# Patient Record
Sex: MALE | Race: AMERICAN INDIAN OR ALASKA NATIVE
[De-identification: names, ages, dates, MRNs, and addresses within clinical notes are randomized per-mention and may not be internally consistent; named-entity substitution may affect disease eponyms.]

---

## 2022-06-18 ENCOUNTER — HOSPITAL ENCOUNTER (EMERGENCY)
Dept: HOSPITAL 5 - ED | Age: 72
LOS: 1 days | Discharge: HOME | End: 2022-06-19
Payer: MEDICARE
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
## 2022-06-18 DIAGNOSIS — F17.290: ICD-10-CM

## 2022-06-18 DIAGNOSIS — I10: ICD-10-CM
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
## 2022-06-18 DIAGNOSIS — S01.112A: Primary | ICD-10-CM

## 2022-06-18 DIAGNOSIS — Z98.890: ICD-10-CM
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
## 2022-06-18 DIAGNOSIS — Z86.73: ICD-10-CM

## 2022-06-18 PROCEDURE — 99283 EMERGENCY DEPT VISIT LOW MDM: CPT

## 2022-06-18 NOTE — EMERGENCY DEPARTMENT REPORT
ED Head Trauma HPI





- General


Chief complaint: Head Injury


Stated complaint: LACERATION LEFT EYEBROW


Time Seen by Provider: 06/18/22 23:24


Source: patient, EMS


Mode of arrival: Stretcher


Limitations: No Limitations





- History of Present Illness


Initial comments: 





71 yo M who present with a fall that occurred about 5 hours ago. Pt denies any 

alcohol drinking today. Bleeding is minimal with direct pressure. He says he 

only trip and fell. Pt however asked me for pain medication. No other modifying 

or associated factors reported. 





- Related Data


                                  Previous Rx's











 Medication  Instructions  Recorded  Last Taken  Type


 


Gabapentin 300 mg PO BID #14 cap 03/07/21 Unknown Rx


 


HYDROcodone/APAP 5-325 [[place] 1 each PO Q6HR PRN #15 tablet 11/17/21 Unknown Rx





5/325]    


 


Ibuprofen [Motrin 400 MG tab] 400 mg PO Q8H PRN #20 tablet 11/17/21 Unknown Rx


 


HYDROcodone/APAP 5-325 [[place] 1 - 2 each PO Q6HR PRN #14 tablet 11/22/21 Unknown

 Rx





5/325]    


 


Spirometers and Accessories 1 each MC TID #1 each 11/22/21 Unknown Rx





[Mistassist]    


 


Naproxen [Naprosyn] 500 mg PO Q12H PRN #20 05/19/22 Unknown Rx


 


traMADoL [Ultram] 50 mg PO Q6HR PRN #20 tablet 05/19/22 Unknown Rx











Allergies/Adverse reactions: 


                                    Allergies











Allergy/AdvReac Type Severity Reaction Status Date / Time


 


No Known Allergies Allergy   Verified 05/19/22 02:04














ED Review of Systems


ROS: 


Stated complaint: LACERATION LEFT EYEBROW


Other details as noted in HPI





Comment: All other systems reviewed and negative


Eyes: other (left upper orbital laceration )





ED Past Medical Hx





- Past Medical History


Previous Medical History?: Yes


Hx Hypertension: Yes


Hx CVA: Yes (Left-sided weakness)


Additional medical history: stroke





- Surgical History


Past Surgical History?: Yes


Additional Surgical History: Neck surgery





- Social History


Smoking Status: Current Every Day Smoker


Substance Use Type: None





- Medications


Home Medications: 


                                Home Medications











 Medication  Instructions  Recorded  Confirmed  Last Taken  Type


 


Gabapentin 300 mg PO BID #14 cap 03/07/21  Unknown Rx


 


HYDROcodone/APAP 5-325 [[place] 1 each PO Q6HR PRN #15 tablet 11/17/21  Unknown Rx





5/325]     


 


Ibuprofen [Motrin 400 MG tab] 400 mg PO Q8H PRN #20 tablet 11/17/21  Unknown Rx


 


HYDROcodone/APAP 5-325 [[place] 1 - 2 each PO Q6HR PRN #14 tablet 11/22/21  Unkno

wn Rx





5/325]     


 


Spirometers and Accessories 1 each MC TID #1 each 11/22/21  Unknown Rx





[Mistassist]     


 


Naproxen [Naprosyn] 500 mg PO Q12H PRN #20 05/19/22  Unknown Rx


 


traMADoL [Ultram] 50 mg PO Q6HR PRN #20 tablet 05/19/22  Unknown Rx














ED Physical Exam





- General


Limitations: No Limitations


General appearance: alert, in no apparent distress





- Head


Head exam: Present: other (left upper orbital laceration 4 cm linear and 2.5 cm 

linear)





- Eye


Eye exam: Present: normal appearance


Pupils: Present: normal accommodation





- ENT


ENT exam: Present: normal exam, normal orophraynx, mucous membranes dry





- Neck


Neck exam: Present: normal inspection, full ROM.  Absent: tenderness





- Respiratory


Respiratory exam: Present: normal lung sounds bilaterally.  Absent: respiratory 

distress, accessory muscle use





- Cardiovascular


Cardiovascular Exam: Present: regular rate, normal rhythm, normal heart sounds





- GI/Abdominal


GI/Abdominal exam: Present: soft, normal bowel sounds.  Absent: distended, 

tenderness





- Extremities Exam


Extremities exam: Present: normal inspection, normal capillary refill.  Absent: 

tenderness





- Back Exam


Back exam: Absent: tenderness





- Neurological Exam


Neurological exam: Present: alert, oriented X3





- Psychiatric


Psychiatric exam: Present: normal affect, normal mood





- Skin


Skin exam: Present: warm, other (laceration to the left upper orbital )





ED Course





                                   Vital Signs











  06/18/22 06/18/22 06/18/22





  20:28 22:42 22:43


 


Temperature 98.4 F  


 


Pulse Rate 91 H  69


 


Respiratory 18  14





Rate   


 


Blood Pressure 121/84  


 


Blood Pressure   148/97





[Left]   


 


O2 Sat by Pulse 98 98 98





Oximetry   














- Reevaluation(s)


Reevaluation #1: 





06/18/22 23:29


here with laceration to the left upper orbital measured to be 4 cm x 0.cm and 

2.5 cm x 0.5 cm linear repaired with 5.0 silk/nylon. see procedure note for 

details. Pt not on blood thinner and denies any headache or concern for any 

intracranial bleeding. 





- Laceration /Wound Repair


  ** Left Upper Eye


Wound Location: head, face


Wound Length (cm): 7


Wound's Depth, Shape: superficial, linear


Wound Explored: no foreign body removed


Irrigated w/ Saline (ccs): 50


Betadine Prep?: Yes


Anesthesia: 1% Lidocaine


Volume Anesthetic (ccs): 5


Wound Debrided: none


Wound Repaired With: sutures


Suture Size/Type: 5:0, nylon


Number of Sutures: 9


Layer Closure?: Yes


Sterile Dressing Applied?: Yes


Progress: 





[pt tolerated procedure wall 


Critical care attestation.: 


If time is entered above; I have spent that time in minutes in the direct care 

of this critically ill patient, excluding procedure time.








ED Disposition


Clinical Impression: 


Laceration of left orbital rim without complication


Qualifiers:


 Encounter type: initial encounter Qualified Code(s): S01.112A - Laceration 

without foreign body of left eyelid and periocular area, initial encounter





Disposition: 01 HOME / SELF CARE / HOMELESS


Is pt being admited?: No


Does the pt Need Aspirin: No


Condition: Stable


Instructions:  Laceration Care, Adult, Easy-to-Read, Sutured Wound Care, 

Easy-to-Read


Additional Instructions: 


Avoid excessive wetness to prevent infecting your sutured wound 





Call and schedule a follow up with your doctor in 5-7 days for suture removal 

and wound check 





It is okay to take Over the counter Tylenol every 6-8 hours as needed for pain 





Call or return to ED if you have any concern or your symptoms worsen 


Time of Disposition: 23:34

## 2022-06-19 ENCOUNTER — HOSPITAL ENCOUNTER (EMERGENCY)
Dept: HOSPITAL 5 - ED | Age: 72
LOS: 2 days | Discharge: HOME | End: 2022-06-21
Payer: MEDICARE

## 2022-06-19 VITALS — SYSTOLIC BLOOD PRESSURE: 155 MMHG | DIASTOLIC BLOOD PRESSURE: 96 MMHG

## 2022-06-19 DIAGNOSIS — R94.31: ICD-10-CM

## 2022-06-19 DIAGNOSIS — Z79.899: ICD-10-CM

## 2022-06-19 DIAGNOSIS — F03.90: Primary | ICD-10-CM

## 2022-06-19 DIAGNOSIS — F17.200: ICD-10-CM

## 2022-06-19 DIAGNOSIS — I10: ICD-10-CM

## 2022-06-19 DIAGNOSIS — Z86.73: ICD-10-CM

## 2022-06-19 LAB
ALBUMIN SERPL-MCNC: 4.8 G/DL (ref 3.9–5)
ALT SERPL-CCNC: 11 UNITS/L (ref 7–56)
BACTERIA #/AREA URNS HPF: (no result) /HPF
BASOPHILS # (AUTO): 0 K/MM3 (ref 0–0.1)
BASOPHILS NFR BLD AUTO: 0.4 % (ref 0–1.8)
BILIRUB UR QL STRIP: (no result)
BLOOD UR QL VISUAL: (no result)
BUN SERPL-MCNC: 8 MG/DL (ref 9–20)
BUN/CREAT SERPL: 7 %
CALCIUM SERPL-MCNC: 9.8 MG/DL (ref 8.4–10.2)
CRP SERPL-MCNC: 1.1 MG/DL (ref 0–1.3)
EOSINOPHIL # BLD AUTO: 0 K/MM3 (ref 0–0.4)
EOSINOPHIL NFR BLD AUTO: 0.4 % (ref 0–4.3)
HCT VFR BLD CALC: 42 % (ref 35.5–45.6)
HEMOLYSIS INDEX: 2
HGB BLD-MCNC: 14.1 GM/DL (ref 11.8–15.2)
HYALINE CASTS #/AREA URNS LPF: 42 /LPF
INR PPP: 1.02 (ref 0.87–1.13)
LYMPHOCYTES # BLD AUTO: 0.7 K/MM3 (ref 1.2–5.4)
LYMPHOCYTES NFR BLD AUTO: 15 % (ref 13.4–35)
MCHC RBC AUTO-ENTMCNC: 34 % (ref 32–34)
MCV RBC AUTO: 98 FL (ref 84–94)
MONOCYTES # (AUTO): 0.3 K/MM3 (ref 0–0.8)
MONOCYTES % (AUTO): 6.3 % (ref 0–7.3)
MUCOUS THREADS #/AREA URNS HPF: (no result) /HPF
PH UR STRIP: 5 [PH] (ref 5–7)
PLATELET # BLD: 163 K/MM3 (ref 140–440)
RBC # BLD AUTO: 4.3 M/MM3 (ref 3.65–5.03)
RBC #/AREA URNS HPF: 4 /HPF (ref 0–6)
UROBILINOGEN UR-MCNC: < 2 MG/DL (ref ?–2)
WBC #/AREA URNS HPF: 6 /HPF (ref 0–6)

## 2022-06-19 PROCEDURE — 72125 CT NECK SPINE W/O DYE: CPT

## 2022-06-19 PROCEDURE — 82553 CREATINE MB FRACTION: CPT

## 2022-06-19 PROCEDURE — 93005 ELECTROCARDIOGRAM TRACING: CPT

## 2022-06-19 PROCEDURE — 84443 ASSAY THYROID STIM HORMONE: CPT

## 2022-06-19 PROCEDURE — 86140 C-REACTIVE PROTEIN: CPT

## 2022-06-19 PROCEDURE — 80048 BASIC METABOLIC PNL TOTAL CA: CPT

## 2022-06-19 PROCEDURE — 80320 DRUG SCREEN QUANTALCOHOLS: CPT

## 2022-06-19 PROCEDURE — 96360 HYDRATION IV INFUSION INIT: CPT

## 2022-06-19 PROCEDURE — 85025 COMPLETE CBC W/AUTO DIFF WBC: CPT

## 2022-06-19 PROCEDURE — 80307 DRUG TEST PRSMV CHEM ANLYZR: CPT

## 2022-06-19 PROCEDURE — 36415 COLL VENOUS BLD VENIPUNCTURE: CPT

## 2022-06-19 PROCEDURE — 84484 ASSAY OF TROPONIN QUANT: CPT

## 2022-06-19 PROCEDURE — 99285 EMERGENCY DEPT VISIT HI MDM: CPT

## 2022-06-19 PROCEDURE — 80053 COMPREHEN METABOLIC PANEL: CPT

## 2022-06-19 PROCEDURE — 82140 ASSAY OF AMMONIA: CPT

## 2022-06-19 PROCEDURE — 70450 CT HEAD/BRAIN W/O DYE: CPT

## 2022-06-19 PROCEDURE — G0480 DRUG TEST DEF 1-7 CLASSES: HCPCS

## 2022-06-19 PROCEDURE — 96361 HYDRATE IV INFUSION ADD-ON: CPT

## 2022-06-19 PROCEDURE — 85610 PROTHROMBIN TIME: CPT

## 2022-06-19 PROCEDURE — 71045 X-RAY EXAM CHEST 1 VIEW: CPT

## 2022-06-19 PROCEDURE — 81001 URINALYSIS AUTO W/SCOPE: CPT

## 2022-06-19 PROCEDURE — 82550 ASSAY OF CK (CPK): CPT

## 2022-06-19 PROCEDURE — 85027 COMPLETE CBC AUTOMATED: CPT

## 2022-06-19 NOTE — XRAY REPORT
CHEST 1 VIEW 



INDICATION: 

Altered Mental Status.



COMPARISON: 

11/16/2021



FINDINGS:



SUPPORT DEVICES: None.



HEART: Within normal limits. 



LUNGS/PLEURA: No acute air space or interstitial disease. 



ADDITIONAL FINDINGS: Moderate scoliotic curvature of the spine again noted.







IMPRESSION:

1. No acute findings.



Signer Name: Yves Collazo MD 

Signed: 6/19/2022 4:39 PM

Workstation Name: [id number]

## 2022-06-20 LAB
BUN SERPL-MCNC: 13 MG/DL (ref 9–20)
BUN/CREAT SERPL: 14 %
CALCIUM SERPL-MCNC: 9.8 MG/DL (ref 8.4–10.2)
HCT VFR BLD CALC: 44.5 % (ref 35.5–45.6)
HEMOLYSIS INDEX: 8
HGB BLD-MCNC: 14.9 GM/DL (ref 11.8–15.2)
MCHC RBC AUTO-ENTMCNC: 34 % (ref 32–34)
MCV RBC AUTO: 98 FL (ref 84–94)
PLATELET # BLD: 179 K/MM3 (ref 140–440)
RBC # BLD AUTO: 4.54 M/MM3 (ref 3.65–5.03)

## 2022-06-20 NOTE — EMERGENCY DEPARTMENT REPORT
ED Altered Mental Status HPI





- General


Chief Complaint: Altered Mental Status


Stated Complaint: AMS/CONFUSION


Time Seen by Provider: 06/19/22 16:13


Source: EMS


Mode of arrival: Stretcher


Limitations: Altered Mental Status





- History of Present Illness


Initial Comments: 





72-year male with a past medical history of CVA residual left-sided weakness, 

previous neck surgery, and hypertension presents to the hospital with possible 

alteration mental status.  Patient was seen here on June 18 and had laceration 

repair of his left eyebrow.  Patient denies LOC and imaging was not obtained at 

that time.  He was discharged and June 19 shortly after midnight and represented

15 hours later after being seen wandering outside by police department.  Patient

reports that he has been walking around outside and was found to be ANO x2 with 

delayed response to questions.  Patient was read triage with alteration in 

mental status diagnosis.  Since he has had 2 sets of labs 1 yesterday and today 

and initially refused CT ordered in triage.  Patient states he is homeless and 

he "lives here".  He states all his family is dead.  He states year is 2003.  He

complains of a mild headache.  He states he fell injuring his head due to his 

chronic left-sided weakness.  He states he typically ambulates with a cane but 

has since misplaced it





As per MAR patient received 1 L normal saline prior to my evaluation and is 

currently eating a meal





Mr. Gold Metzger ()  [phone] is the point of contact. 








- Related Data


                                  Previous Rx's











 Medication  Instructions  Recorded  Last Taken  Type


 


Gabapentin 300 mg PO BID #14 cap 03/07/21 Unknown Rx


 


HYDROcodone/APAP 5-325 [[facility] 1 each PO Q6HR PRN #15 tablet 11/17/21 Unknown Rx





5/325]    


 


Ibuprofen [Motrin 400 MG tab] 400 mg PO Q8H PRN #20 tablet 11/17/21 Unknown Rx


 


HYDROcodone/APAP 5-325 [[facility] 1 - 2 each PO Q6HR PRN #14 tablet 11/22/21 Unknown

 Rx





5/325]    


 


Spirometers and Accessories 1 each MC TID #1 each 11/22/21 Unknown Rx





[Mistassist]    


 


Naproxen [Naprosyn] 500 mg PO Q12H PRN #20 05/19/22 Unknown Rx


 


traMADoL [Ultram] 50 mg PO Q6HR PRN #20 tablet 05/19/22 Unknown Rx











                                    Allergies











Allergy/AdvReac Type Severity Reaction Status Date / Time


 


No Known Allergies Allergy   Verified 05/19/22 02:04














ED Review of Systems


ROS: 


Stated complaint: AMS/CONFUSION


Other details as noted in HPI





Comment: All other systems reviewed and negative





ED Past Medical Hx





- Past Medical History


Previous Medical History?: Yes


Hx Hypertension: Yes


Hx CVA: Yes (Left-sided weakness)


Additional medical history: stroke





- Surgical History


Past Surgical History?: Yes


Additional Surgical History: Neck surgery





- Social History


Smoking Status: Current Every Day Smoker





- Medications


Home Medications: 


                                Home Medications











 Medication  Instructions  Recorded  Confirmed  Last Taken  Type


 


Gabapentin 300 mg PO BID #14 cap 03/07/21  Unknown Rx


 


HYDROcodone/APAP 5-325 [[facility] 1 each PO Q6HR PRN #15 tablet 11/17/21  Unknown Rx





5/325]     


 


Ibuprofen [Motrin 400 MG tab] 400 mg PO Q8H PRN #20 tablet 11/17/21  Unknown Rx


 


HYDROcodone/APAP 5-325 [[facility] 1 - 2 each PO Q6HR PRN #14 tablet 11/22/21  Unk

nown Rx





5/325]     


 


Spirometers and Accessories 1 each MC TID #1 each 11/22/21  Unknown Rx





[Mistassist]     


 


Naproxen [Naprosyn] 500 mg PO Q12H PRN #20 05/19/22  Unknown Rx


 


traMADoL [Ultram] 50 mg PO Q6HR PRN #20 tablet 05/19/22  Unknown Rx














ED Physical Exam





- General


Limitations: Altered Mental Status





- Other


Other exam information: 





General: No acute distress, just completed a meal


Head: Left brow laceration status postrepair with dressing in place


Eyes: normal appearance


ENT: Moist mucous membranes


Neck: Normal appearance, no midline tenderness, previous posterior neck surgery 

scar


Chest: Clear to auscultation bilaterally


CV: Regular rate and rhythm


Abdomen: Soft, normal bowel sounds, nontender, nondistended, no rebound or 

guarding


Back: Normal inspection


Extremity: Normal inspection, full range of motion


Neuro: Alert O x 2, no facial asymmetry, speech clear, weak left hand  with 

drift to the left upper and lower extremities chronic as per patient.  5/5 right

 upper and lower extremities


Psych: Appropriate behavior


Skin: No rash





ED Course


                                   Vital Signs











  06/19/22 06/19/22 06/19/22





  15:48 17:18 17:20


 


Temperature  98.3 F 


 


Pulse Rate 98 H 82 


 


Respiratory  18 





Rate   


 


Blood Pressure  179/88 


 


Blood Pressure 136/82  





[Left]   


 


O2 Sat by Pulse 97 92 92





Oximetry   














  06/20/22 06/20/22 06/20/22





  12:00 12:06 12:16


 


Temperature   


 


Pulse Rate 90  


 


Respiratory 16 22 20





Rate   


 


Blood Pressure   155/87


 


Blood Pressure 155/87  





[Left]   


 


O2 Sat by Pulse 99  98





Oximetry   














  06/20/22 06/20/22 06/20/22





  12:30 12:46 13:00


 


Temperature   


 


Pulse Rate   


 


Respiratory 20 16 21





Rate   


 


Blood Pressure 156/96 155/93 131/82


 


Blood Pressure   





[Left]   


 


O2 Sat by Pulse 97 96 96





Oximetry   














  06/20/22 06/20/22 06/20/22





  13:16 13:30 13:46


 


Temperature   


 


Pulse Rate   73


 


Respiratory 23 21 27 H





Rate   


 


Blood Pressure 156/96 156/96 152/80


 


Blood Pressure   





[Left]   


 


O2 Sat by Pulse 97 97 96





Oximetry   














  06/20/22 06/20/22 06/20/22





  14:00 14:05 14:16


 


Temperature   


 


Pulse Rate 74 77 66


 


Respiratory 25 H 15 20





Rate   


 


Blood Pressure 151/83  155/86


 


Blood Pressure  155/86 





[Left]   


 


O2 Sat by Pulse 96 99 96





Oximetry   














  06/20/22 06/20/22 06/20/22





  14:30 14:46 15:00


 


Temperature   


 


Pulse Rate 62 79 71


 


Respiratory 16 17 20





Rate   


 


Blood Pressure 144/86 147/83 164/99


 


Blood Pressure   





[Left]   


 


O2 Sat by Pulse 95  





Oximetry   














  06/20/22 06/20/22 06/20/22





  15:16 15:30 15:46


 


Temperature   


 


Pulse Rate 66 66 75


 


Respiratory 20 20 22





Rate   


 


Blood Pressure 144/86 161/94 157/85


 


Blood Pressure   





[Left]   


 


O2 Sat by Pulse   





Oximetry   














  06/20/22 06/20/22 06/20/22





  16:00 16:16 16:30


 


Temperature   


 


Pulse Rate 67 65 61


 


Respiratory 21 22 19





Rate   


 


Blood Pressure 157/85 157/85 157/85


 


Blood Pressure   





[Left]   


 


O2 Sat by Pulse   





Oximetry   














- Consultations


Consultation #1: 





06/20/22 15:50


I spoke to case management Gillian at this time.  She was able to inform me that

 patient lives in a group home.  I was able to obtain the group home providers 

name and number from previous medical record in May.  Apparently Sang to 

arrange for transport multiple times while patient was in the waiting room 

however and even attempted to call the nurses station multiple times.  For some 

reason transportation was not provided despite being arranged by Gillian twice. 

A new chart was made for the patient to be seen for possible confusion and ou

tside exposure.  I spoke to Mr. Gold Metzger () from the [facility] and 

see states patient never made it home from 6/18 visit and he has been awaiting 

his arrival.  He confirms that patient has baseline confusion and cannot be 

safely discharged to find his own way home.  Once I receive CAT scan reports to 

medically clear patient I will recontact case management to arrange transport 

back to [facility].  I plan to instruct nursing staff to keep patient in the 

main department instead of the waiting rooms so that he does not wander and 

leave the department.  Also I want to make sure that a nursing staff is 

responsible for following up to ensure patient's transportation is arranged and 

he can be safely discharged back home





- Lab Data


Result diagrams: 


                                 06/20/22 08:05





                                 06/20/22 08:05


                                   Lab Results











  06/19/22 06/19/22 06/19/22 Range/Units





  16:15 16:15 16:33 


 


WBC    4.8  (4.5-11.0)  K/mm3


 


RBC    4.30  (3.65-5.03)  M/mm3


 


Hgb    14.1  (11.8-15.2)  gm/dl


 


Hct    42.0  (35.5-45.6)  %


 


MCV    98 H  (84-94)  fl


 


MCH    33 H  (28-32)  pg


 


MCHC    34  (32-34)  %


 


RDW    14.4  (13.2-15.2)  %


 


Plt Count    163  (140-440)  K/mm3


 


Lymph % (Auto)    15.0  (13.4-35.0)  %


 


Mono % (Auto)    6.3  (0.0-7.3)  %


 


Eos % (Auto)    0.4  (0.0-4.3)  %


 


Baso % (Auto)    0.4  (0.0-1.8)  %


 


Lymph # (Auto)    0.7 L  (1.2-5.4)  K/mm3


 


Mono # (Auto)    0.3  (0.0-0.8)  K/mm3


 


Eos # (Auto)    0.0  (0.0-0.4)  K/mm3


 


Baso # (Auto)    0.0  (0.0-0.1)  K/mm3


 


Seg Neutrophils %    77.9 H  (40.0-70.0)  %


 


Seg Neutrophils #    3.7  (1.8-7.7)  K/mm3


 


PT     (12.2-14.9)  Sec.


 


INR     (0.87-1.13)  


 


Sodium     (137-145)  mmol/L


 


Potassium     (3.6-5.0)  mmol/L


 


Chloride     ()  mmol/L


 


Carbon Dioxide     (22-30)  mmol/L


 


Anion Gap     mmol/L


 


BUN     (9-20)  mg/dL


 


Creatinine     (0.8-1.3)  mg/dL


 


Estimated GFR     ml/min


 


BUN/Creatinine Ratio     %


 


Glucose     ()  mg/dL


 


Lactic Acid     (0.7-2.0)  mmol/L


 


Calcium     (8.4-10.2)  mg/dL


 


Total Bilirubin     (0.1-1.2)  mg/dL


 


AST     (5-40)  units/L


 


ALT     (7-56)  units/L


 


Alkaline Phosphatase     ()  units/L


 


Total Creatine Kinase     ()  units/L


 


CK-MB (CK-2)     (0.0-4.0)  ng/mL


 


CK-MB (CK-2) Rel Index     (0-4)  


 


Troponin T     (0.00-0.029)  ng/mL


 


C-Reactive Protein     (0.00-1.30)  mg/dL


 


Total Protein     (6.3-8.2)  g/dL


 


Albumin     (3.9-5)  g/dL


 


Albumin/Globulin Ratio     %


 


TSH     (0.270-4.200)  mlU/mL


 


Urine Color  Yellow    (Yellow)  


 


Urine Turbidity  Clear    (Clear)  


 


Urine pH  5.0    (5.0-7.0)  


 


Ur Specific Gravity  1.019    (1.003-1.030)  


 


Urine Protein  100 mg/dl    (Negative)  mg/dL


 


Urine Glucose (UA)  Neg    (Negative)  mg/dL


 


Urine Ketones  Neg    (Negative)  mg/dL


 


Urine Blood  Sm    (Negative)  


 


Urine Nitrite  Neg    (Negative)  


 


Urine Bilirubin  Neg    (Negative)  


 


Urine Urobilinogen  < 2.0    (<2.0)  mg/dL


 


Ur Leukocyte Esterase  Neg    (Negative)  


 


Urine WBC (Auto)  6.0    (0.0-6.0)  /HPF


 


Urine RBC (Auto)  4.0    (0.0-6.0)  /HPF


 


U Epithel Cells (Auto)  1.0    (0-13.0)  /HPF


 


Urine Bacteria (Auto)  1+    (Negative)  /HPF


 


Hyaline Casts  42    /LPF


 


Urine Mucus  3+    /HPF


 


Salicylates     (2.8-20.0)  mg/dL


 


Urine Opiates Screen   Negative   


 


Urine Methadone Screen   Negative   


 


Acetaminophen     (10.0-30.0)  ug/mL


 


Ur Barbiturates Screen   Negative   


 


Ur Phencyclidine Scrn   Negative   


 


Ur Amphetamines Screen   Negative   


 


U Benzodiazepines Scrn   Negative   


 


Urine Cocaine Screen   Negative   


 


U Marijuana (THC) Screen   Positive   


 


Drugs of Abuse Note   Disclamer   


 


Plasma/Serum Alcohol     (0-0.07)  %














  06/19/22 06/19/22 06/19/22 Range/Units





  16:33 16:33 16:33 


 


WBC     (4.5-11.0)  K/mm3


 


RBC     (3.65-5.03)  M/mm3


 


Hgb     (11.8-15.2)  gm/dl


 


Hct     (35.5-45.6)  %


 


MCV     (84-94)  fl


 


MCH     (28-32)  pg


 


MCHC     (32-34)  %


 


RDW     (13.2-15.2)  %


 


Plt Count     (140-440)  K/mm3


 


Lymph % (Auto)     (13.4-35.0)  %


 


Mono % (Auto)     (0.0-7.3)  %


 


Eos % (Auto)     (0.0-4.3)  %


 


Baso % (Auto)     (0.0-1.8)  %


 


Lymph # (Auto)     (1.2-5.4)  K/mm3


 


Mono # (Auto)     (0.0-0.8)  K/mm3


 


Eos # (Auto)     (0.0-0.4)  K/mm3


 


Baso # (Auto)     (0.0-0.1)  K/mm3


 


Seg Neutrophils %     (40.0-70.0)  %


 


Seg Neutrophils #     (1.8-7.7)  K/mm3


 


PT  14.5    (12.2-14.9)  Sec.


 


INR  1.02    (0.87-1.13)  


 


Sodium   142   (137-145)  mmol/L


 


Potassium   3.7   (3.6-5.0)  mmol/L


 


Chloride   105.5   ()  mmol/L


 


Carbon Dioxide   22   (22-30)  mmol/L


 


Anion Gap   18   mmol/L


 


BUN   8 L   (9-20)  mg/dL


 


Creatinine   1.1   (0.8-1.3)  mg/dL


 


Estimated GFR   > 60   ml/min


 


BUN/Creatinine Ratio   7   %


 


Glucose   134 H   ()  mg/dL


 


Lactic Acid    1.90  (0.7-2.0)  mmol/L


 


Calcium   9.8   (8.4-10.2)  mg/dL


 


Total Bilirubin   0.80   (0.1-1.2)  mg/dL


 


AST   35   (5-40)  units/L


 


ALT   11   (7-56)  units/L


 


Alkaline Phosphatase   103   ()  units/L


 


Total Creatine Kinase   1453 H   ()  units/L


 


CK-MB (CK-2)     (0.0-4.0)  ng/mL


 


CK-MB (CK-2) Rel Index     (0-4)  


 


Troponin T   < 0.010   (0.00-0.029)  ng/mL


 


C-Reactive Protein   1.10   (0.00-1.30)  mg/dL


 


Total Protein   8.3 H   (6.3-8.2)  g/dL


 


Albumin   4.8   (3.9-5)  g/dL


 


Albumin/Globulin Ratio   1.4   %


 


TSH     (0.270-4.200)  mlU/mL


 


Urine Color     (Yellow)  


 


Urine Turbidity     (Clear)  


 


Urine pH     (5.0-7.0)  


 


Ur Specific Gravity     (1.003-1.030)  


 


Urine Protein     (Negative)  mg/dL


 


Urine Glucose (UA)     (Negative)  mg/dL


 


Urine Ketones     (Negative)  mg/dL


 


Urine Blood     (Negative)  


 


Urine Nitrite     (Negative)  


 


Urine Bilirubin     (Negative)  


 


Urine Urobilinogen     (<2.0)  mg/dL


 


Ur Leukocyte Esterase     (Negative)  


 


Urine WBC (Auto)     (0.0-6.0)  /HPF


 


Urine RBC (Auto)     (0.0-6.0)  /HPF


 


U Epithel Cells (Auto)     (0-13.0)  /HPF


 


Urine Bacteria (Auto)     (Negative)  /HPF


 


Hyaline Casts     /LPF


 


Urine Mucus     /HPF


 


Salicylates     (2.8-20.0)  mg/dL


 


Urine Opiates Screen     


 


Urine Methadone Screen     


 


Acetaminophen     (10.0-30.0)  ug/mL


 


Ur Barbiturates Screen     


 


Ur Phencyclidine Scrn     


 


Ur Amphetamines Screen     


 


U Benzodiazepines Scrn     


 


Urine Cocaine Screen     


 


U Marijuana (THC) Screen     


 


Drugs of Abuse Note     


 


Plasma/Serum Alcohol     (0-0.07)  %














  06/19/22 06/19/22 06/19/22 Range/Units





  16:33 16:33 16:33 


 


WBC     (4.5-11.0)  K/mm3


 


RBC     (3.65-5.03)  M/mm3


 


Hgb     (11.8-15.2)  gm/dl


 


Hct     (35.5-45.6)  %


 


MCV     (84-94)  fl


 


MCH     (28-32)  pg


 


MCHC     (32-34)  %


 


RDW     (13.2-15.2)  %


 


Plt Count     (140-440)  K/mm3


 


Lymph % (Auto)     (13.4-35.0)  %


 


Mono % (Auto)     (0.0-7.3)  %


 


Eos % (Auto)     (0.0-4.3)  %


 


Baso % (Auto)     (0.0-1.8)  %


 


Lymph # (Auto)     (1.2-5.4)  K/mm3


 


Mono # (Auto)     (0.0-0.8)  K/mm3


 


Eos # (Auto)     (0.0-0.4)  K/mm3


 


Baso # (Auto)     (0.0-0.1)  K/mm3


 


Seg Neutrophils %     (40.0-70.0)  %


 


Seg Neutrophils #     (1.8-7.7)  K/mm3


 


PT     (12.2-14.9)  Sec.


 


INR     (0.87-1.13)  


 


Sodium     (137-145)  mmol/L


 


Potassium     (3.6-5.0)  mmol/L


 


Chloride     ()  mmol/L


 


Carbon Dioxide     (22-30)  mmol/L


 


Anion Gap     mmol/L


 


BUN     (9-20)  mg/dL


 


Creatinine     (0.8-1.3)  mg/dL


 


Estimated GFR     ml/min


 


BUN/Creatinine Ratio     %


 


Glucose     ()  mg/dL


 


Lactic Acid     (0.7-2.0)  mmol/L


 


Calcium     (8.4-10.2)  mg/dL


 


Total Bilirubin     (0.1-1.2)  mg/dL


 


AST     (5-40)  units/L


 


ALT     (7-56)  units/L


 


Alkaline Phosphatase     ()  units/L


 


Total Creatine Kinase     ()  units/L


 


CK-MB (CK-2)     (0.0-4.0)  ng/mL


 


CK-MB (CK-2) Rel Index     (0-4)  


 


Troponin T     (0.00-0.029)  ng/mL


 


C-Reactive Protein     (0.00-1.30)  mg/dL


 


Total Protein     (6.3-8.2)  g/dL


 


Albumin     (3.9-5)  g/dL


 


Albumin/Globulin Ratio     %


 


TSH  0.650    (0.270-4.200)  mlU/mL


 


Urine Color     (Yellow)  


 


Urine Turbidity     (Clear)  


 


Urine pH     (5.0-7.0)  


 


Ur Specific Gravity     (1.003-1.030)  


 


Urine Protein     (Negative)  mg/dL


 


Urine Glucose (UA)     (Negative)  mg/dL


 


Urine Ketones     (Negative)  mg/dL


 


Urine Blood     (Negative)  


 


Urine Nitrite     (Negative)  


 


Urine Bilirubin     (Negative)  


 


Urine Urobilinogen     (<2.0)  mg/dL


 


Ur Leukocyte Esterase     (Negative)  


 


Urine WBC (Auto)     (0.0-6.0)  /HPF


 


Urine RBC (Auto)     (0.0-6.0)  /HPF


 


U Epithel Cells (Auto)     (0-13.0)  /HPF


 


Urine Bacteria (Auto)     (Negative)  /HPF


 


Hyaline Casts     /LPF


 


Urine Mucus     /HPF


 


Salicylates   < 0.3 L   (2.8-20.0)  mg/dL


 


Urine Opiates Screen     


 


Urine Methadone Screen     


 


Acetaminophen    5.0 L  (10.0-30.0)  ug/mL


 


Ur Barbiturates Screen     


 


Ur Phencyclidine Scrn     


 


Ur Amphetamines Screen     


 


U Benzodiazepines Scrn     


 


Urine Cocaine Screen     


 


U Marijuana (THC) Screen     


 


Drugs of Abuse Note     


 


Plasma/Serum Alcohol     (0-0.07)  %














  06/19/22 06/20/22 06/20/22 Range/Units





  16:33 08:05 08:05 


 


WBC   5.0   (4.5-11.0)  K/mm3


 


RBC   4.54   (3.65-5.03)  M/mm3


 


Hgb   14.9   (11.8-15.2)  gm/dl


 


Hct   44.5   (35.5-45.6)  %


 


MCV   98 H   (84-94)  fl


 


MCH   33 H   (28-32)  pg


 


MCHC   34   (32-34)  %


 


RDW   14.6   (13.2-15.2)  %


 


Plt Count   179   (140-440)  K/mm3


 


Lymph % (Auto)     (13.4-35.0)  %


 


Mono % (Auto)     (0.0-7.3)  %


 


Eos % (Auto)     (0.0-4.3)  %


 


Baso % (Auto)     (0.0-1.8)  %


 


Lymph # (Auto)     (1.2-5.4)  K/mm3


 


Mono # (Auto)     (0.0-0.8)  K/mm3


 


Eos # (Auto)     (0.0-0.4)  K/mm3


 


Baso # (Auto)     (0.0-0.1)  K/mm3


 


Seg Neutrophils %     (40.0-70.0)  %


 


Seg Neutrophils #     (1.8-7.7)  K/mm3


 


PT     (12.2-14.9)  Sec.


 


INR     (0.87-1.13)  


 


Sodium    140  (137-145)  mmol/L


 


Potassium    4.0  (3.6-5.0)  mmol/L


 


Chloride    104.4  ()  mmol/L


 


Carbon Dioxide    24  (22-30)  mmol/L


 


Anion Gap    16  mmol/L


 


BUN    13  (9-20)  mg/dL


 


Creatinine    0.9  (0.8-1.3)  mg/dL


 


Estimated GFR    > 60  ml/min


 


BUN/Creatinine Ratio    14  %


 


Glucose    146 H  ()  mg/dL


 


Lactic Acid     (0.7-2.0)  mmol/L


 


Calcium    9.8  (8.4-10.2)  mg/dL


 


Total Bilirubin     (0.1-1.2)  mg/dL


 


AST     (5-40)  units/L


 


ALT     (7-56)  units/L


 


Alkaline Phosphatase     ()  units/L


 


Total Creatine Kinase     ()  units/L


 


CK-MB (CK-2)     (0.0-4.0)  ng/mL


 


CK-MB (CK-2) Rel Index     (0-4)  


 


Troponin T    < 0.010  (0.00-0.029)  ng/mL


 


C-Reactive Protein     (0.00-1.30)  mg/dL


 


Total Protein     (6.3-8.2)  g/dL


 


Albumin     (3.9-5)  g/dL


 


Albumin/Globulin Ratio     %


 


TSH     (0.270-4.200)  mlU/mL


 


Urine Color     (Yellow)  


 


Urine Turbidity     (Clear)  


 


Urine pH     (5.0-7.0)  


 


Ur Specific Gravity     (1.003-1.030)  


 


Urine Protein     (Negative)  mg/dL


 


Urine Glucose (UA)     (Negative)  mg/dL


 


Urine Ketones     (Negative)  mg/dL


 


Urine Blood     (Negative)  


 


Urine Nitrite     (Negative)  


 


Urine Bilirubin     (Negative)  


 


Urine Urobilinogen     (<2.0)  mg/dL


 


Ur Leukocyte Esterase     (Negative)  


 


Urine WBC (Auto)     (0.0-6.0)  /HPF


 


Urine RBC (Auto)     (0.0-6.0)  /HPF


 


U Epithel Cells (Auto)     (0-13.0)  /HPF


 


Urine Bacteria (Auto)     (Negative)  /HPF


 


Hyaline Casts     /LPF


 


Urine Mucus     /HPF


 


Salicylates     (2.8-20.0)  mg/dL


 


Urine Opiates Screen     


 


Urine Methadone Screen     


 


Acetaminophen     (10.0-30.0)  ug/mL


 


Ur Barbiturates Screen     


 


Ur Phencyclidine Scrn     


 


Ur Amphetamines Screen     


 


U Benzodiazepines Scrn     


 


Urine Cocaine Screen     


 


U Marijuana (THC) Screen     


 


Drugs of Abuse Note     


 


Plasma/Serum Alcohol  < 0.01    (0-0.07)  %














  06/20/22 Range/Units





  14:12 


 


WBC   (4.5-11.0)  K/mm3


 


RBC   (3.65-5.03)  M/mm3


 


Hgb   (11.8-15.2)  gm/dl


 


Hct   (35.5-45.6)  %


 


MCV   (84-94)  fl


 


MCH   (28-32)  pg


 


MCHC   (32-34)  %


 


RDW   (13.2-15.2)  %


 


Plt Count   (140-440)  K/mm3


 


Lymph % (Auto)   (13.4-35.0)  %


 


Mono % (Auto)   (0.0-7.3)  %


 


Eos % (Auto)   (0.0-4.3)  %


 


Baso % (Auto)   (0.0-1.8)  %


 


Lymph # (Auto)   (1.2-5.4)  K/mm3


 


Mono # (Auto)   (0.0-0.8)  K/mm3


 


Eos # (Auto)   (0.0-0.4)  K/mm3


 


Baso # (Auto)   (0.0-0.1)  K/mm3


 


Seg Neutrophils %   (40.0-70.0)  %


 


Seg Neutrophils #   (1.8-7.7)  K/mm3


 


PT   (12.2-14.9)  Sec.


 


INR   (0.87-1.13)  


 


Sodium   (137-145)  mmol/L


 


Potassium   (3.6-5.0)  mmol/L


 


Chloride   ()  mmol/L


 


Carbon Dioxide   (22-30)  mmol/L


 


Anion Gap   mmol/L


 


BUN   (9-20)  mg/dL


 


Creatinine   (0.8-1.3)  mg/dL


 


Estimated GFR   ml/min


 


BUN/Creatinine Ratio   %


 


Glucose   ()  mg/dL


 


Lactic Acid   (0.7-2.0)  mmol/L


 


Calcium   (8.4-10.2)  mg/dL


 


Total Bilirubin   (0.1-1.2)  mg/dL


 


AST   (5-40)  units/L


 


ALT   (7-56)  units/L


 


Alkaline Phosphatase   ()  units/L


 


Total Creatine Kinase  1099 H  ()  units/L


 


CK-MB (CK-2)  5.3 H  (0.0-4.0)  ng/mL


 


CK-MB (CK-2) Rel Index  0.4  (0-4)  


 


Troponin T  < 0.010  (0.00-0.029)  ng/mL


 


C-Reactive Protein   (0.00-1.30)  mg/dL


 


Total Protein   (6.3-8.2)  g/dL


 


Albumin   (3.9-5)  g/dL


 


Albumin/Globulin Ratio   %


 


TSH   (0.270-4.200)  mlU/mL


 


Urine Color   (Yellow)  


 


Urine Turbidity   (Clear)  


 


Urine pH   (5.0-7.0)  


 


Ur Specific Gravity   (1.003-1.030)  


 


Urine Protein   (Negative)  mg/dL


 


Urine Glucose (UA)   (Negative)  mg/dL


 


Urine Ketones   (Negative)  mg/dL


 


Urine Blood   (Negative)  


 


Urine Nitrite   (Negative)  


 


Urine Bilirubin   (Negative)  


 


Urine Urobilinogen   (<2.0)  mg/dL


 


Ur Leukocyte Esterase   (Negative)  


 


Urine WBC (Auto)   (0.0-6.0)  /HPF


 


Urine RBC (Auto)   (0.0-6.0)  /HPF


 


U Epithel Cells (Auto)   (0-13.0)  /HPF


 


Urine Bacteria (Auto)   (Negative)  /HPF


 


Hyaline Casts   /LPF


 


Urine Mucus   /HPF


 


Salicylates   (2.8-20.0)  mg/dL


 


Urine Opiates Screen   


 


Urine Methadone Screen   


 


Acetaminophen   (10.0-30.0)  ug/mL


 


Ur Barbiturates Screen   


 


Ur Phencyclidine Scrn   


 


Ur Amphetamines Screen   


 


U Benzodiazepines Scrn   


 


Urine Cocaine Screen   


 


U Marijuana (THC) Screen   


 


Drugs of Abuse Note   


 


Plasma/Serum Alcohol   (0-0.07)  %














- EKG Data


-: EKG Interpreted by Me


EKG shows normal: sinus rhythm, ST-T waves (Biphasic T waves V3, T wave 

inversions V4 and 5)


Rate: normal





- Radiology Data


Radiology results: report reviewed


No acute findings were found on chest x-ray, CT head, CT cervical spine.  See 

report





- Medical Decision Making





72-year-old male presents to the hospital for possible confusion.  Patient was 

discharged yesterday and while awaiting transport home wandered onto the ground 

he was brought in by police department.  ED evaluation reveals elevated CK which

 is improving with IV hydration (3 L normal saline).  No signs of renal failure.

  Imaging studies unremarkable.  EKG abnormal but troponin negative multiple veto

es the patient does not complain of chest pain.  Outpatient follow-up will be 

advised.  I confirmed with patient's  Mr. Malcolm that his 

mental status is his baseline and he never made it home as planned.  Patient 

will be discharged home.


Critical Care Time: No


Critical care attestation.: 


If time is entered above; I have spent that time in minutes in the direct care 

of this critically ill patient, excluding procedure time.








ED Disposition


Clinical Impression: 


 History of CVA with residual deficit, Dementia, Elevated CK, Abnormal EKG





Disposition: 01 HOME / SELF CARE / HOMELESS


Is pt being admited?: No


Does the pt Need Aspirin: No


Condition: Stable


Instructions:  Rhabdomyolysis


Additional Instructions: 


Drink plenty of water to help flush out the muscle enzymes out your system.  

Return if you have dark [employer] colored urine.  Follow-up with your doctor or 

doctor/clinic provided.  Return if symptoms worsen as indicated by your 

discharge instructions.





Also your EKG is abnormal.  You do not have any chest pain or abnormal cardiac 

enzymes at this time.  Please follow-up with a cardiologist as an outpatient for

 further heart evaluation.  A cardiologist has been provided for follow-up


Referrals: 


JEFERSON ARGUETA MD [Staff Physician] - 3-5 Days (Cardiologist)


OLAF PALMA MD [Staff Physician] - 3-5 Days (Primary care doctor)


Time of Disposition: 16:59

## 2022-06-20 NOTE — CAT SCAN REPORT
CT CERVICAL SPINE: 6/20/2022



INDICATION / CLINICAL INFORMATION:

fall head injury, confusion.



COMPARISON: 

None available.



FINDINGS:



CT images of the cervical spine were obtained. Images are evaluated in the axial, coronal, and sagitt
al planes.



 Unenhanced CT images of the cervical spine were obtained.



There is no evidence of acute abnormality.



There is been prior posterior spinous process fusion procedure at the C4-5 level. The C4-5 disc space
 appears to be fused. Posterior fixation wires are present, with apparent fusion involving posterior 
elements from C3 through C5. Degenerative disc space narrowing and osteophyte formation is prominent 
at the C6-7 and C7-T1 levels.







CRANIOCERVICAL JUNCTION: Unremarkable.



PARASPINAL STRUCTURES: Unremarkable









IMPRESSION:

 No acute abnormality. Postoperative and degenerative changes.









All CT scans at this location are performed using dose reduction to ALARA by means of automated expos
ure control.



Signer Name: Mukesh Rosenthal MD 

Signed: 6/20/2022 3:58 PM

Workstation Name: [facility]\""[id number]

## 2022-06-20 NOTE — CAT SCAN REPORT
CT BRAIN: 6/20/2022



INDICATION / CLINICAL INFORMATION:

fall head injury, confusion.



COMPARISON: 

CT brain 3/7/2021



FINDINGS:



BRAIN/INTRACRANIAL STRUCTURES: Unenhanced CT images of the brain demonstrate no evidence of acute abn
ormality.



Ventricles and sulci are prominent in size, consistent with diffuse age-related atrophic change. Exte
nsive chronic white matter hypoattenuation bilateral basal ganglia lacunar changes are present, consi
stent with extensive chronic small vessel ischemic change.



There is no evidence of acute large vessel territory ischemic injury, hemorrhage, or mass. There are 
no abnormal extra-axial fluid collections.









EXTRACRANIAL STRUCTURES: Unremarkable.







IMPRESSION:

 No acute abnormality. Stable extensive chronic and age-related changes.









All CT scans at this location are performed using dose reduction to ALARA by means of automated expos
ure control.



Signer Name: Mukesh Rosenthal MD 

Signed: 6/20/2022 3:56 PM

Workstation Name: [facility] [id number]

## 2022-06-21 VITALS — DIASTOLIC BLOOD PRESSURE: 69 MMHG | SYSTOLIC BLOOD PRESSURE: 156 MMHG

## 2022-06-23 NOTE — ELECTROCARDIOGRAPH REPORT
[facility]

                                       

Test Date:    2022               Test Time:    13:35:07

Pat Name:     ABRAHAM ARORA               Department:   

Patient ID:   [id number]          Room:          

Gender:       M                        Technician:   NURSE

:          1950               Requested By: GENOVEVA BECERRIL

Order Number: [id number]              Reading MD:   Danna Rodriguez

                                 Measurements

Intervals                              Axis          

Rate:         76                       P:            69

MD:           144                      QRS:          -34

QRSD:         91                       T:            53

QT:           489                                    

QTc:          551                                    

                           Interpretive Statements

Sinus rhythm

Left ventricular hypertrophy

Nonspecific anterior T wave inversions, consider ischemia

Compared to ECG 2022 01:02:22

Anterior T wave inversions are now evident

Electronically Signed On 2022 18:44:31 EDT by Danna Rodriguez